# Patient Record
Sex: FEMALE | Race: BLACK OR AFRICAN AMERICAN | NOT HISPANIC OR LATINO | URBAN - METROPOLITAN AREA
[De-identification: names, ages, dates, MRNs, and addresses within clinical notes are randomized per-mention and may not be internally consistent; named-entity substitution may affect disease eponyms.]

---

## 2022-07-09 ENCOUNTER — TELEPHONE ENCOUNTER (OUTPATIENT)
Dept: URBAN - METROPOLITAN AREA CLINIC 121 | Facility: CLINIC | Age: 62
End: 2022-07-09

## 2022-07-10 ENCOUNTER — TELEPHONE ENCOUNTER (OUTPATIENT)
Dept: URBAN - METROPOLITAN AREA CLINIC 121 | Facility: CLINIC | Age: 62
End: 2022-07-10

## 2022-07-10 RX ORDER — ERGOCALCIFEROL (VITAMIN D2)
POWDER (GRAM) MISCELLANEOUS
Refills: 0 | Status: ACTIVE | COMMUNITY
Start: 2011-08-12

## 2022-07-10 RX ORDER — CALCIUM NO.38/D3/MAG/BORON ASP 500MG/15ML
LIQUID (ML) ORAL
Refills: 0 | Status: ACTIVE | COMMUNITY
Start: 2011-08-12

## 2022-07-10 RX ORDER — CLONAZEPAM 0.5 MG/1
TABLET ORAL
Refills: 0 | Status: ACTIVE | COMMUNITY
Start: 2011-08-12

## 2022-07-10 RX ORDER — SIMVASTATIN 40 MG/1
TABLET, FILM COATED ORAL
Refills: 0 | Status: ACTIVE | COMMUNITY
Start: 2011-08-12

## 2022-07-10 RX ORDER — PIROXICAM 20 MG/1
CAPSULE ORAL
Refills: 0 | Status: ACTIVE | COMMUNITY
Start: 2011-08-12

## 2022-07-10 RX ORDER — OMEGA-3S/DHA/EPA/FISH OIL 980-1400MG
CAPSULE,DELAYED RELEASE (ENTERIC COATED) ORAL
Refills: 0 | Status: ACTIVE | COMMUNITY
Start: 2011-08-12

## 2022-07-30 ENCOUNTER — TELEPHONE ENCOUNTER (OUTPATIENT)
Age: 62
End: 2022-07-30

## 2022-07-31 ENCOUNTER — TELEPHONE ENCOUNTER (OUTPATIENT)
Age: 62
End: 2022-07-31

## 2024-01-25 ENCOUNTER — OFFICE VISIT (OUTPATIENT)
Dept: URBAN - METROPOLITAN AREA CLINIC 7 | Facility: CLINIC | Age: 64
End: 2024-01-25
Payer: COMMERCIAL

## 2024-01-25 ENCOUNTER — LAB OUTSIDE AN ENCOUNTER (OUTPATIENT)
Dept: URBAN - METROPOLITAN AREA CLINIC 7 | Facility: CLINIC | Age: 64
End: 2024-01-25

## 2024-01-25 ENCOUNTER — DASHBOARD ENCOUNTERS (OUTPATIENT)
Age: 64
End: 2024-01-25

## 2024-01-25 VITALS
HEIGHT: 66 IN | DIASTOLIC BLOOD PRESSURE: 82 MMHG | TEMPERATURE: 97.9 F | WEIGHT: 139 LBS | SYSTOLIC BLOOD PRESSURE: 118 MMHG | RESPIRATION RATE: 16 BRPM | BODY MASS INDEX: 22.34 KG/M2

## 2024-01-25 DIAGNOSIS — R13.19 ESOPHAGEAL DYSPHAGIA: ICD-10-CM

## 2024-01-25 DIAGNOSIS — R10.84 GENERALIZED ABDOMINAL PAIN: ICD-10-CM

## 2024-01-25 DIAGNOSIS — R12 PYROSIS: ICD-10-CM

## 2024-01-25 DIAGNOSIS — Z12.11 COLON CANCER SCREENING: ICD-10-CM

## 2024-01-25 PROCEDURE — 99204 OFFICE O/P NEW MOD 45 MIN: CPT | Performed by: INTERNAL MEDICINE

## 2024-01-25 RX ORDER — MONTELUKAST SODIUM 10 MG/1
1 TABLET TABLET, FILM COATED ORAL ONCE A DAY
Qty: 30 | Status: ACTIVE | COMMUNITY
Start: 2024-01-25

## 2024-01-25 RX ORDER — CELECOXIB 200 MG/1
TAKE ONE CAPSULE BY MOUTH ONE TIME DAILY AT DINNER CAPSULE ORAL
Qty: 30 UNSPECIFIED | Refills: 2 | Status: ACTIVE | COMMUNITY

## 2024-01-25 RX ORDER — BUDESONIDE AND FORMOTEROL FUMARATE DIHYDRATE 160; 4.5 UG/1; UG/1
INHALE TWO PUFFS BY MOUTH TWICE A DAY AEROSOL RESPIRATORY (INHALATION)
Qty: 10.2 UNSPECIFIED | Refills: 3 | Status: ACTIVE | COMMUNITY

## 2024-01-25 RX ORDER — ERGOCALCIFEROL (VITAMIN D2)
POWDER (GRAM) MISCELLANEOUS
Refills: 0 | Status: DISCONTINUED | COMMUNITY
Start: 2011-08-12

## 2024-01-25 RX ORDER — SIMVASTATIN 40 MG/1
1 TABLET IN THE EVENING TABLET, FILM COATED ORAL ONCE A DAY
Refills: 0 | Status: ACTIVE | COMMUNITY
Start: 2011-08-12

## 2024-01-25 RX ORDER — PIROXICAM 20 MG/1
CAPSULE ORAL
Refills: 0 | Status: DISCONTINUED | COMMUNITY
Start: 2011-08-12

## 2024-01-25 RX ORDER — OMEGA-3S/DHA/EPA/FISH OIL 980-1400MG
CAPSULE,DELAYED RELEASE (ENTERIC COATED) ORAL
Refills: 0 | Status: DISCONTINUED | COMMUNITY
Start: 2011-08-12

## 2024-01-25 RX ORDER — TIOTROPIUM BROMIDE 18 UG/1
INHALE THE CONTENTS OF ONE CAPSULE WITH INHALATION DEVICE VIA HANDIHALER ONE TIME DAILY IN THE MORNING CAPSULE ORAL; RESPIRATORY (INHALATION)
Qty: 30 UNSPECIFIED | Refills: 0 | Status: ACTIVE | COMMUNITY

## 2024-01-25 RX ORDER — ZAFIRLUKAST 20 MG/1
1 TABLET 1 HOUR BEFORE OR 2 HOURS AFTER MEALS TABLET, FILM COATED ORAL TWICE A DAY
Status: ACTIVE | COMMUNITY

## 2024-01-25 RX ORDER — TIZANIDINE HYDROCHLORIDE 4 MG/1
TAKE ONE CAPSULE BY MOUTH TWICE A DAY CAPSULE ORAL
Qty: 30 UNSPECIFIED | Refills: 0 | Status: ACTIVE | COMMUNITY

## 2024-01-25 RX ORDER — CALCIUM NO.38/D3/MAG/BORON ASP 500MG/15ML
LIQUID (ML) ORAL
Refills: 0 | Status: DISCONTINUED | COMMUNITY
Start: 2011-08-12

## 2024-01-25 RX ORDER — CLONAZEPAM 0.5 MG/1
1 TABLET TABLET ORAL ONCE A DAY
Refills: 0 | Status: ACTIVE | COMMUNITY
Start: 2011-08-12

## 2024-01-25 RX ORDER — LEVALBUTEROL HYDROCHLORIDE 1.25 MG/3ML
INHALE ONE VIAL VIA NEBULIZER ONE TIME DAILY AS NEEDED SOLUTION RESPIRATORY (INHALATION)
Qty: 75 UNSPECIFIED | Refills: 4 | Status: ACTIVE | COMMUNITY

## 2024-01-25 NOTE — PHYSICAL EXAM HENT:
2 weeks    Head, normocephalic, atraumatic, Face, Face within normal limits, Ears, External ears within normal limits, Nose/Nasopharynx, External nose normal appearance, nares patent, no nasal discharge, Mouth and Throat, Oral cavity appearance normal, Lips, Appearance normal , Head, normocephalic, atraumatic, Face, Face within normal limits, Ears, External ears within normal limits, Nose/Nasopharynx, External nose normal appearance, nares patent, no nasal discharge, Mouth and Throat, Oral cavity appearance normal, Lips, Appearance normal

## 2024-01-25 NOTE — HPI-TODAY'S VISIT:
64yo F with hx of mid and RLQ abd pain starting over one year ago happens daily and happesn without pattern can happen with meals or without feels like a pinching type pain last minutes and then goes away helps if she fasts  Had a CT scan done within the past 6 months for hx of lung ca in 2021 had chemo and radiation and now CT every 6 months.  She and a left kidney resection   last colonoscopy over 10yrs ago cologuard a few years ago was reported to be normal sheoes have episodic mild sensation of solid food sticking. does not happen everytime she eats but on occasion she feels solids dont go down smoothly

## 2024-01-25 NOTE — PHYSICAL EXAM CARDIOVASCULAR:
no edema,  no murmurs,  regular rate and rhythm , no edema,  no murmurs,  regular rate and rhythm denies pain/discomfort (Rating = 0)

## 2024-02-07 ENCOUNTER — EGD (OUTPATIENT)
Dept: URBAN - METROPOLITAN AREA SURGERY CENTER 5 | Facility: SURGERY CENTER | Age: 64
End: 2024-02-07

## 2024-02-14 ENCOUNTER — COLON (OUTPATIENT)
Dept: URBAN - METROPOLITAN AREA SURGERY CENTER 5 | Facility: SURGERY CENTER | Age: 64
End: 2024-02-14

## 2024-04-24 ENCOUNTER — COLON (OUTPATIENT)
Dept: URBAN - METROPOLITAN AREA SURGERY CENTER 5 | Facility: SURGERY CENTER | Age: 64
End: 2024-04-24